# Patient Record
Sex: MALE | Race: BLACK OR AFRICAN AMERICAN | Employment: UNEMPLOYED | URBAN - NONMETROPOLITAN AREA
[De-identification: names, ages, dates, MRNs, and addresses within clinical notes are randomized per-mention and may not be internally consistent; named-entity substitution may affect disease eponyms.]

---

## 2017-08-10 ENCOUNTER — HISTORY (OUTPATIENT)
Dept: EMERGENCY MEDICINE | Facility: OTHER | Age: 32
End: 2017-08-10

## 2017-09-11 ENCOUNTER — HISTORY (OUTPATIENT)
Dept: FAMILY MEDICINE | Facility: OTHER | Age: 32
End: 2017-09-11

## 2017-09-11 ENCOUNTER — OFFICE VISIT - GICH (OUTPATIENT)
Dept: FAMILY MEDICINE | Facility: OTHER | Age: 32
End: 2017-09-11

## 2017-09-11 DIAGNOSIS — Z00.00 ENCOUNTER FOR GENERAL ADULT MEDICAL EXAMINATION WITHOUT ABNORMAL FINDINGS: ICD-10-CM

## 2017-09-11 DIAGNOSIS — F51.01 PRIMARY INSOMNIA: ICD-10-CM

## 2017-10-08 ENCOUNTER — HISTORY (OUTPATIENT)
Dept: EMERGENCY MEDICINE | Facility: OTHER | Age: 32
End: 2017-10-08

## 2017-10-10 ENCOUNTER — OFFICE VISIT - GICH (OUTPATIENT)
Dept: ORTHOPEDICS | Facility: OTHER | Age: 32
End: 2017-10-10

## 2017-10-10 ENCOUNTER — HISTORY (OUTPATIENT)
Dept: ORTHOPEDICS | Facility: OTHER | Age: 32
End: 2017-10-10

## 2017-10-10 DIAGNOSIS — S92.344A CLOSED NONDISPLACED FRACTURE OF FOURTH METATARSAL BONE OF RIGHT FOOT: ICD-10-CM

## 2017-10-10 DIAGNOSIS — S92.334A CLOSED NONDISPLACED FRACTURE OF THIRD METATARSAL BONE OF RIGHT FOOT: ICD-10-CM

## 2017-10-10 DIAGNOSIS — S92.321A CLOSED DISPLACED FRACTURE OF SECOND METATARSAL BONE OF RIGHT FOOT: ICD-10-CM

## 2017-10-19 ENCOUNTER — AMBULATORY - GICH (OUTPATIENT)
Dept: ORTHOPEDICS | Facility: OTHER | Age: 32
End: 2017-10-19

## 2017-10-19 DIAGNOSIS — M79.671 PAIN OF RIGHT FOOT: ICD-10-CM

## 2017-10-24 ENCOUNTER — HISTORY (OUTPATIENT)
Dept: ORTHOPEDICS | Facility: OTHER | Age: 32
End: 2017-10-24

## 2017-10-24 ENCOUNTER — OFFICE VISIT - GICH (OUTPATIENT)
Dept: ORTHOPEDICS | Facility: OTHER | Age: 32
End: 2017-10-24

## 2017-10-24 ENCOUNTER — HOSPITAL ENCOUNTER (OUTPATIENT)
Dept: RADIOLOGY | Facility: OTHER | Age: 32
End: 2017-10-24
Attending: ORTHOPAEDIC SURGERY

## 2017-10-24 DIAGNOSIS — M79.671 PAIN OF RIGHT FOOT: ICD-10-CM

## 2017-10-24 DIAGNOSIS — S92.321D: ICD-10-CM

## 2017-10-24 DIAGNOSIS — S92.334D: ICD-10-CM

## 2017-10-24 DIAGNOSIS — S92.344D: ICD-10-CM

## 2017-11-08 ENCOUNTER — COMMUNICATION - GICH (OUTPATIENT)
Dept: ORTHOPEDICS | Facility: OTHER | Age: 32
End: 2017-11-08

## 2017-11-22 ENCOUNTER — AMBULATORY - GICH (OUTPATIENT)
Dept: ORTHOPEDICS | Facility: OTHER | Age: 32
End: 2017-11-22

## 2017-11-22 DIAGNOSIS — M79.671 PAIN OF RIGHT FOOT: ICD-10-CM

## 2017-11-28 ENCOUNTER — AMBULATORY - GICH (OUTPATIENT)
Dept: ORTHOPEDICS | Facility: OTHER | Age: 32
End: 2017-11-28

## 2017-11-28 DIAGNOSIS — M79.671 PAIN OF RIGHT FOOT: ICD-10-CM

## 2017-12-27 NOTE — PROGRESS NOTES
Patient Information     Patient Name MRN Sex Rox Dominguez [id number] Male 1985      Progress Notes by Arden Perdomo DO at 10/10/2017  2:15 PM     Author:  Arden Perdomo DO Service:  (none) Author Type:  PHYS- Osteopathic     Filed:  10/10/2017  3:24 PM Encounter Date:  10/10/2017 Status:  Signed     :  Arden Perdomo DO (PHYS- Osteopathic)            Rox Butler was seen in consultation for Dr. Castillo for a chief complaint of right foot injury.    CHIEF COMPLAINT: Rox Butler is a 32 y.o.  male  Chief Complaint     Patient presents with       Consult      Right foot doi-10/7/17       HISTORY OF PRESENTING INJURY:  32-year-old male presents for orthopedic evaluation of a right foot injury that occurred 3 days ago. The patient lives in [place], [place]. He was lifting and moving and old television when it fell onto his right foot. He was not wearing shoes. Only wearing socks. Injury occurred 3 days ago . Developed increased pain and swelling of the foot and presented for evaluation in the emergency room the next day . He developed bruising and swelling. X-rays demonstrated metatarsal fractures.  Patient presents with crutches and wearing a Cam Walker.  Treatment includes elevating and keeping weight off the foot.  Complains primarily of pain and swelling of the right foot.  The patient relates he received 15 pain pills from the emergency room.   No other injuries.  Currently not working.    REVIEW OF SYSTEMS:  Constitutional:  Denies constitutional problems  Cardiovascular: normal  Respiratory: normal    The review of systems as documented in the HPI and on the intake questionnaire, completed by the patient 10/10/2017, have been reviewed by myself and the pertinent positives and negatives addressed.  The remainder of the complete review of systems was non-contributory.    (PFSH) PAST, FAMILY, and/or SOCIAL HISTORY:    PAST MEDICAL HISTORY:  Past  "Medical History:     Diagnosis  Date     No Significant Past Medical History        PAST SURGICAL HISTORY:  Past Surgical History:      Procedure  Laterality Date     WRIST SURGERY Left        ALLERGIES:  No Known Allergies    CURRENT MEDICATIONS:  Current Outpatient Prescriptions       Medication  Sig Dispense Refill     Crutch For home use. 1 Device 0     doxylamine (UNISOM) 25 mg tablet Take 1 tablet by mouth at bedtime if needed for Sleep. 30 tablet 2     ibuprofen (ADVIL; MOTRIN) 200 mg cap Take 600 mg by mouth 4 times daily if needed.       oxyCODONE-acetaminophen, 5-325 mg, (PERCOCET) 5-325 mg per tablet Take 1 tablet by mouth every 4 hours if needed  for Pain Max acetaminophen dose: 4000mg in 24 hrs. 15 tablet 0     No current facility-administered medications for this visit.      Medications have been reviewed by me and are current to the best of my knowledge and ability.      FAMILY HISTORY:  No family history on file.    Additional [facility] information documented on the intake form completed by the patient 10/10/2017 was reviewed by myself.    PHYSICAL EXAM:   /80  Pulse 88  Ht 1.803 m (5' 11\")  Wt 83.9 kg (185 lb)  BMI 25.8 kg/m2 Body mass index is 25.8 kg/(m^2).    General Appearance: Pleasant male in good appearance, mood and affect.  Alert and orientated times three ( time, date and location).    Right  Foot/ Ankle examination  patients right calf is supple.  Moderate to large amount of swelling of the right foot.  No skin lacerations.  Palpable dorsalis pedis pulse.  Bruising to the foot and toes.  No pain of the right ankle with examination.    Xray/MRI/MRA:  Radiographic images where independently reviewed and discussed with the patient.    x-rays of the right foot demonstrates a comminuted second metatarsal base fracture with mild displacement.  Mostly transverse fractures, extra-articular of the base of the third and fourth metatarsals with good alignment.    Attending Doctor: COLE, " PRABHU ([id number])  :       CALVIN GILLETTE ([id number])  Report Date:       10/08/2017 11:52:12  Report Status:       Final  ======================= Begin of Report Content ======================    Exam:XR FOOT 3 VIEWS RIGHT  History: Foot injury on the right  Comparisons:None.  Technique: 3 views  Findings: There are fractures through the proximal thirds of the shafts of the second third and fourth metatarsals. The first and fifth metatarsals are intact the metatarsophalangeal and interphalangeal joints appear normal. The tarsal metatarsal joints are normally aligned    Impression: Proximal second third and fourth metatarsal fractures  Electronically Signed By: Calvin Gillette M.D. on 10/8/2017 11:48 AM    IMPRESSION:  Right second metatarsal base fracture, comminuted, with mild displacement  right third metatarsal base fracture, transverse in good alignment  right fourth metatarsal base fracture, transverse In the alignment  history of crush injury right foot 3 days ago October 7   Moderate to large amount of swelling of the right foot     PLAN:  Discussion included review of x-rays. Discussed the fractured metatarsal bases. Overall good alignment. Mild displacement of the second.  Recommendation to keep weight off the foot.  Continue with crutches.  Ice and elevate the foot above the heart.  Cam Walker seems to be working for him so continue with the Cam Walker. Discussed elevating the foot above the heart.   The patient requested some additional medication at this time.  Prescription switched to Vicodin 5/325 mg. Dispense 40. Caution with use.  Anticipate nonsurgical management for the fractures.  Discussed with the patient plan to have x-rays sent to foot and ankle specialist in [place] for evaluation.  We will plan to contact patient with any additional recommendations.  Otherwise, recheck and x-ray of the foot in 2 weeks.  Anticipate limited weightbearing for at least 6 weeks.  Questions  answered.    Arden Perdomo D.O., ALCONAKeithO.  Orthopedic Surgeon    [facility] [place]  [address] [place] [address]  Phone [phone]  Fax [phone]    2:43 PM 10/10/2017

## 2017-12-27 NOTE — PROGRESS NOTES
"Patient Information     Patient Name MRN Sex Rox Dominguez [id number] Male 1985      Progress Notes by Arden Perdomo DO at 10/24/2017  3:15 PM     Author:  Arden Perdomo DO Service:  (none) Author Type:  PHYS- Osteopathic     Filed:  10/24/2017  4:06 PM Encounter Date:  10/24/2017 Status:  Signed     :  Arden Perdomo DO (PHYS- Osteopathic)            PROGRESS NOTE    SUBJECTIVE:  Rox Butler is here for recheck of a right foot.     HPI: 17 days after right foot injury and fracture of the second, third and fourth metatarsal bases. The patient presents with a Cam Walker and crutches. Keeping weight off the foot. Elevating as needed and taking occasional pain medication. Continued pain to the foot area with associated swelling. Requesting a refill on pain medication. .    Review of Systems:  Constitutional: Denies constitutional problems    PFSH:  No change in information. See earlier PFSH questionnaire completed by the patient on initial visit.    OBJECTIVE:  /76  Pulse 96  Ht 1.803 m (5' 11\")  Wt 83.9 kg (185 lb)  BMI 25.8 kg/m2 Body mass index is 25.8 kg/(m^2).  Patient is alert and orientated x3 and answers questions appropriately.    Right Foot/ankle exam:   Patient continues to have at least moderate swelling of the right foot. Pain along the metatarsal base with palpation. Palpable pulse. Capillary refill less than 2 seconds. Nontender right ankle with palpation.    XRAY:   Radiographic images where independently reviewed and discussed with the patient.    X-rays today demonstrates the fractured second, third and fourth metatarsal bases and very similar alignment and position.    IMPRESSION:  Right foot second, third and fourth metatarsal base fractures secondary to a crush injury on 10/7/17.  Moderate swelling of the foot.    PLAN:  Discussion included review of x-rays.  Recommend continuing with the Cam Walker and crutches keeping most of the weight off the " foot. Okay for balance weight.  Recommend ice and elevation to the foot.  I refilled a prescription for Vicodin. Recommend using it mostly at nighttime as needed. Discussed over-the-counter medication use.  Plan to recheck and x-ray the foot in 5 weeks.  Questions answered.    Arden Perdomo D.O.  Orthopedic Surgeon    [address] [address]  Phone [phone]  Fax [phone]    4:02 PM 10/24/2017

## 2017-12-28 NOTE — PROGRESS NOTES
Patient Information     Patient Name MRN Sex Rox Dominguez [id number] Male 1985      Progress Notes by Eli Gomez PA-C at 2017  9:15 AM     Author:  Eli Gomez PA-C Service:  (none) Author Type:  PHYS- Physician Assistant     Filed:  2017 10:00 AM Encounter Date:  2017 Status:  Signed     :  Eli Gomez PA-C (PHYS- Physician Assistant)            Nursing Notes:   Shira Alaniz  2017  9:26 AM  Signed  Patient is here for physical, is currently at [facility].   Shira Alaniz LPN .............2017  9:12 AM      HPI: Rox Butler is a 32 y.o. male who presents for a yearly exam.  Concerns include: Currently at [facility] for alcohol abuse.     Trouble falling and staying asleep. Melatonin has not worked well in the past. Interested in trying something different. Has not tried benadryl or Unisom in the past. No caffeine in the evening.     STD concerns: no, declines screening.   Cholesterol/DM concerns/screening: no  Prostate cancer screening discussed:  Not indicated, patient is average risk and younger than 50.  Family history of colon or prostate CA?: no  Colonoscopy: na  Immunizations: declines    There are no active problems to display for this patient.      Past Medical History:     Diagnosis  Date     No Significant Past Medical History        Past Surgical History:      Procedure  Laterality Date     WRIST SURGERY Left        Social History     Social History        Marital status:  Single     Spouse name: N/A     Number of children:  N/A     Years of education:  N/A     Occupational History      Not on file.     Social History Main Topics         Smoking status:   Current Every Day Smoker     Packs/day:  0.50     Types:  Cigarettes     Smokeless tobacco:   Never Used     Alcohol use   No      Comment: Currently at [facility]      Drug use:   No     Sexual activity:   Not on file     Other Topics  Concern      "Not on file      Social History Narrative       No family history on file.    No current outpatient prescriptions on file.     No current facility-administered medications for this visit.      Medications have been reviewed by me and are current to the best of my knowledge and ability.       REVIEW OF SYSTEMS:  Refer to HPI.    Physical Exam:  /80  Pulse 84  Ht 1.797 m (5' 10.75\")  Wt 82.1 kg (181 lb)  BMI 25.42 kg/m2   CONSTITUTIONAL:  Alert, cooperative, NAD.  HEAD:  Normal. Normocephalic, atraumatic.  EYES:  Normal external eye, conjunctiva, lids.  No scleral icterus.  ENT/MOUTH:  External ears and nose normal.  Moist mucous membranes.    ENDO: No thyromegaly or thyroid nodules.  LYMPH:  No cervical or supraclavicular LA.    CARDIOVASCULAR: Regular, S1, S2.  No S3 or S4.  No murmur/gallop/rub.  No peripheral edema.  RESPIRATORY: CTA bilaterally, no wheezes, rhonchi or rales.  GI: Bowel sounds wnl.  Soft, nontender, nondistended.  No masses or HSM.  No rebound or guarding.  : declines exam  MSKEL: Grossly normal ROM.  No clubbing.  INTEGUMENTARY:  Warm, dry.  No rash noted on exposed skin.  NEUROLOGIC:  Facies symmetric.  Grossly normal movement and tone.  No tremor.  PSYCHIATRIC:  Affect normal.  Speech fluent.       PHQ Depression Screen  Date of PHQ exam: 09/11/17  Over the last 2 weeks, how often have you been bothered by any of the following problems?  1. Little interest or pleasure in doing things: 0 - Not at all  2. Feeling down, depressed, or hopeless: 0 - Not at all       No results found for this or any previous visit.    ASSESSMENT/PLAN:    ICD-10-CM    1. Physical exam, annual Z00.00    2. Primary insomnia F51.01 doxylamine (UNISOM) 25 mg tablet       Insomnia - Encouraged to take benadryl 25-50mg, melatonin, or unisom to treat insomnia.  Encouraged routine bedtime, getting up in the morning at the same time, minimal naps during the day, and no caffeine after 3pm. Return in 1-2 months if " "having persistent or worsening symptoms.   Gave Rx for unisom.       ?Sleep as long as necessary to feel rested (usually seven to eight hours for adults) and then get out of bed  ?Maintain a regular sleep schedule, particularly a regular wake-up time in the morning  ?Try not to force sleep  ?Avoid caffeinated beverages after lunch  ?Avoid alcohol near bedtime (eg, late afternoon and evening)  ?Avoid smoking or other nicotine intake, particularly during the evening  ?Adjust the bedroom environment as needed to decrease stimuli (eg, reduce ambient light, turn off the television or radio)  ?Avoid prolonged use of light-emitting screens (laptops, tablets, smartphones, [employer]) before bedtime  ?Resolve concerns or worries before bedtime  ?Exercise regularly for at least 20 minutes, preferably more than four to five hours prior to bedtime  ?Avoid daytime naps, especially if they are longer than 20 to 30 minutes or occur late in the day      Declines screening or vaccines.   No concerns at this time.   Return in 1 year for repeat physical.     Patient Instructions   Healthy Strategies  1. Eat at least 3 meals a day and never skip breakfast.  2. Eat more slowly.  3. Decrease portion size.  4. Provide structure by using meal replacement bars or shakes, and/or low calorie frozen meals.  5. For good nutrition incorporate fruit, vegetables, whole grains, lean protein, and low-fat dairy.  6. Remove trigger foods from your environment to avoid impulse eating.  7. Increase physical activity: get a pedometer and aim for 10,000 steps a day or 30-35 minutes of activity 5 days per week.  8. Weigh yourself daily or at least weekly.  9. Keep a record of what you eat and your activity.  10. Establish a support system such as a friend, group or program.  11. Read Lane Garibay's \"Eat to Live\". Remember it is important to have a minimum of 1200 calories a day, okay to use olive oil, 40 grams of fiber daily. No more than two servings ( the " size of your palm) of red meat a week.     Please consider the following general health recommendations:    Eat a quality diet (generally, low in simple sugars, starches, cholesterol and saturated fat.)    Please get 1500 mg of calcium in divided doses with 1500 units vitamin D in your diet daily.     Stay physically active. Regular walking or other exercise is one of the best ways to minimize pain of arthritis; maintain independence and mobility; maintain bone strength; maintain conditioning of your heart. Find something you enjoy and a friend to do it with you.    Maintain ideal weight. Your Body mass index is Body mass index is 25.42 kg/(m^2).. Generally a BMI of 20-25 is considered ideal. Overweight is defined as 25-30, Obese is 30-35 and markedly obese is greater than 35.    Apply sun block (SPF 25 or greater) on exposed skin anytime you are out in the sun to prevent skin cancer.     Wear a seatbelt whenever you are in a car.    Obtain a flu shot every fall.    You should have a tetanus booster at least once every 10 years.    Check blood sugar annually. Cholesterol annually unless you have had a normal level when last checked within 5 years.     I recommend that you have a general physical exam every year.           Colon and prostate cancer screening discussed.      Counseled on healthy diet and exercise.    Eli Gomez PA-C ....................  9/11/2017   9:27 AM

## 2017-12-28 NOTE — PATIENT INSTRUCTIONS
"Patient Information     Patient Name MRN Rox Camp [id number] Male 1985      Patient Instructions by Eli Gomez PA-C at 2017  9:15 AM     Author:  Eli Gomez PA-C  Service:  (none) Author Type:  PHYS- Physician Assistant     Filed:  2017  9:59 AM  Encounter Date:  2017 Status:  Addendum     :  Eli Gomez PA-C (PHYS- Physician Assistant)        Related Notes: Original Note by Eli Gomez PA-C (PHYS- Physician Assistant) filed at 2017  9:36 AM            Healthy Strategies  1. Eat at least 3 meals a day and never skip breakfast.  2. Eat more slowly.  3. Decrease portion size.  4. Provide structure by using meal replacement bars or shakes, and/or low calorie frozen meals.  5. For good nutrition incorporate fruit, vegetables, whole grains, lean protein, and low-fat dairy.  6. Remove trigger foods from your environment to avoid impulse eating.  7. Increase physical activity: get a pedometer and aim for 10,000 steps a day or 30-35 minutes of activity 5 days per week.  8. Weigh yourself daily or at least weekly.  9. Keep a record of what you eat and your activity.  10. Establish a support system such as a friend, group or program.  11. Read Lane Garibay's \"Eat to Live\". Remember it is important to have a minimum of 1200 calories a day, okay to use olive oil, 40 grams of fiber daily. No more than two servings ( the size of your palm) of red meat a week.     Please consider the following general health recommendations:    Eat a quality diet (generally, low in simple sugars, starches, cholesterol and saturated fat.)    Please get 1500 mg of calcium in divided doses with 1500 units vitamin D in your diet daily.     Stay physically active. Regular walking or other exercise is one of the best ways to minimize pain of arthritis; maintain independence and mobility; maintain bone strength; maintain conditioning of your heart. Find something you enjoy and a " friend to do it with you.    Maintain ideal weight. Your Body mass index is Body mass index is 25.42 kg/(m^2).. Generally a BMI of 20-25 is considered ideal. Overweight is defined as 25-30, Obese is 30-35 and markedly obese is greater than 35.    Apply sun block (SPF 25 or greater) on exposed skin anytime you are out in the sun to prevent skin cancer.     Wear a seatbelt whenever you are in a car.    Obtain a flu shot every fall.    You should have a tetanus booster at least once every 10 years.    Check blood sugar annually. Cholesterol annually unless you have had a normal level when last checked within 5 years.     I recommend that you have a general physical exam every year.         Insomnia - Encouraged to take benadryl 25-50mg, melatonin, or unisom to treat insomnia.  Encouraged routine bedtime, getting up in the morning at the same time, minimal naps during the day, and no caffeine after 3pm. Return in 1-2 months if having persistent or worsening symptoms.       ?Sleep as long as necessary to feel rested (usually seven to eight hours for adults) and then get out of bed  ?Maintain a regular sleep schedule, particularly a regular wake-up time in the morning  ?Try not to force sleep  ?Avoid caffeinated beverages after lunch  ?Avoid alcohol near bedtime (eg, late afternoon and evening)  ?Avoid smoking or other nicotine intake, particularly during the evening  ?Adjust the bedroom environment as needed to decrease stimuli (eg, reduce ambient light, turn off the television or radio)  ?Avoid prolonged use of light-emitting screens (laptops, tablets, smartphones, [employer]ooks) before bedtime  ?Resolve concerns or worries before bedtime  ?Exercise regularly for at least 20 minutes, preferably more than four to five hours prior to bedtime  ?Avoid daytime naps, especially if they are longer than 20 to 30 minutes or occur late in the day

## 2017-12-28 NOTE — TELEPHONE ENCOUNTER
Patient Information     Patient Name MRN Rox Camp [id number] Male 1985      Telephone Encounter by Viki Ugarte at 2017  8:44 AM     Author:  Viki Ugarte Service:  (none) Author Type:  (none)     Filed:  2017  8:58 AM Encounter Date:  2017 Status:  Signed     :  Viki Ugarte            Called patient and let him know that he is one month out from his injury already and that we usually do not refill pain medication this long.  Dr. Perdomo had talked to him about using the over the counter medications at his last visit a couple weeks ago.  Patient stated that he still has a little bit of pain.  He did say he would try the over the counter pain medications.  Dr. Perdomo agreed with this that patient should try OTC medication for his pain.    Viki Ugarte LPN .......2017 8:57 AM

## 2017-12-30 NOTE — NURSING NOTE
Patient Information     Patient Name MRN Rox Camp [id number] Male 1985      Nursing Note by Shira Alaniz at 2017  9:15 AM     Author:  Shira Alaniz Service:  (none) Author Type:  (none)     Filed:  2017  9:26 AM Encounter Date:  2017 Status:  Signed     :  Shira Alaniz            Patient is here for physical, is currently at [facility].   Shira Alaniz LPN .............2017  9:12 AM

## 2017-12-30 NOTE — NURSING NOTE
Patient Information     Patient Name MRN Sex Rox Dominguez [id number] Male 1985      Nursing Note by Gosselin, Norma J at 10/10/2017  2:15 PM     Author:  Gosselin, Norma J Service:  (none) Author Type:  (none)     Filed:  10/10/2017  2:25 PM Encounter Date:  10/10/2017 Status:  Signed     :  Gosselin, Norma J            Consult right foot injury 10/7/17.  Referred by Dr Castillo.  Norma J Gosselin LPN....................  10/10/2017   2:21 PM

## 2017-12-30 NOTE — NURSING NOTE
Patient Information     Patient Name MRN Rox Camp [id number] Male 1985      Nursing Note by Viki Ugarte at 10/24/2017  3:15 PM     Author:  Viki Ugarte Service:  (none) Author Type:  (none)     Filed:  10/24/2017  3:08 PM Encounter Date:  10/24/2017 Status:  Signed     :  Viki Ugarte            Patient is here for a follow up on his right foot.  DOI: 10/7/17  Viki Ugarte LPN .......10/24/2017 3:08 PM

## 2018-01-27 VITALS
HEIGHT: 71 IN | BODY MASS INDEX: 25.34 KG/M2 | BODY MASS INDEX: 25.9 KG/M2 | HEIGHT: 71 IN | WEIGHT: 185 LBS | DIASTOLIC BLOOD PRESSURE: 80 MMHG | WEIGHT: 181 LBS | DIASTOLIC BLOOD PRESSURE: 80 MMHG | HEART RATE: 96 BPM | DIASTOLIC BLOOD PRESSURE: 76 MMHG | SYSTOLIC BLOOD PRESSURE: 130 MMHG | SYSTOLIC BLOOD PRESSURE: 138 MMHG | WEIGHT: 185 LBS | SYSTOLIC BLOOD PRESSURE: 122 MMHG | HEART RATE: 84 BPM | BODY MASS INDEX: 25.9 KG/M2 | HEART RATE: 88 BPM | HEIGHT: 71 IN

## 2018-02-11 ENCOUNTER — DOCUMENTATION ONLY (OUTPATIENT)
Dept: FAMILY MEDICINE | Facility: OTHER | Age: 33
End: 2018-02-11

## 2018-02-11 RX ORDER — OXYCODONE AND ACETAMINOPHEN 5; 325 MG/1; MG/1
1 TABLET ORAL EVERY 4 HOURS PRN
COMMUNITY
Start: 2017-10-08 | End: 2018-06-04

## 2018-02-11 RX ORDER — OMEGA-3 FATTY ACIDS/FISH OIL 300-1000MG
600 CAPSULE ORAL 4 TIMES DAILY PRN
COMMUNITY
End: 2018-05-30

## 2018-02-11 RX ORDER — CRUTCH
EACH MISCELLANEOUS
COMMUNITY
Start: 2017-10-08 | End: 2018-06-04

## 2018-02-11 RX ORDER — HYDROCODONE BITARTRATE AND ACETAMINOPHEN 5; 325 MG/1; MG/1
1 TABLET ORAL EVERY 6 HOURS PRN
COMMUNITY
Start: 2017-10-24 | End: 2018-05-30

## 2018-05-30 ENCOUNTER — HOSPITAL ENCOUNTER (EMERGENCY)
Facility: OTHER | Age: 33
Discharge: HOME | End: 2018-05-30
Attending: PHYSICIAN ASSISTANT | Admitting: PHYSICIAN ASSISTANT
Payer: COMMERCIAL

## 2018-05-30 VITALS
RESPIRATION RATE: 16 BRPM | TEMPERATURE: 97.9 F | HEIGHT: 71 IN | SYSTOLIC BLOOD PRESSURE: 121 MMHG | BODY MASS INDEX: 25.2 KG/M2 | OXYGEN SATURATION: 99 % | WEIGHT: 180 LBS | DIASTOLIC BLOOD PRESSURE: 78 MMHG | HEART RATE: 78 BPM

## 2018-05-30 DIAGNOSIS — K08.89 PAIN, DENTAL: ICD-10-CM

## 2018-05-30 DIAGNOSIS — K04.7 DENTAL ABSCESS: ICD-10-CM

## 2018-05-30 PROCEDURE — 99283 EMERGENCY DEPT VISIT LOW MDM: CPT | Mod: Z6 | Performed by: PHYSICIAN ASSISTANT

## 2018-05-30 PROCEDURE — 99282 EMERGENCY DEPT VISIT SF MDM: CPT | Performed by: PHYSICIAN ASSISTANT

## 2018-05-30 RX ORDER — HYDROCODONE BITARTRATE AND ACETAMINOPHEN 5; 325 MG/1; MG/1
1 TABLET ORAL EVERY 6 HOURS PRN
Qty: 15 TABLET | Refills: 0 | Status: SHIPPED | OUTPATIENT
Start: 2018-05-30 | End: 2018-06-04

## 2018-05-30 RX ORDER — IBUPROFEN 800 MG/1
800 TABLET, FILM COATED ORAL EVERY 8 HOURS PRN
Qty: 60 TABLET | Refills: 0 | Status: SHIPPED | OUTPATIENT
Start: 2018-05-30 | End: 2018-06-04

## 2018-05-30 ASSESSMENT — ENCOUNTER SYMPTOMS
EYE REDNESS: 0
CONFUSION: 0
SHORTNESS OF BREATH: 0
NECK STIFFNESS: 0
COLOR CHANGE: 0
HEADACHES: 0
DIFFICULTY URINATING: 0
ARTHRALGIAS: 0
FACIAL SWELLING: 0
FEVER: 0
ABDOMINAL PAIN: 0
CHILLS: 0

## 2018-05-30 NOTE — DISCHARGE INSTRUCTIONS
"  Dental Abscess    An abscess is a pocket of pus at the tip of a tooth root in your jaw bone. It is caused by an infection at the root of the tooth. It can cause pain and swelling of the gum, cheek, or jaw. Pain may spread from the tooth to your ear or the area of your jaw on the same side. If the abscess isn t treated, it appears as a bubble or swelling on the gum near the tooth. The pressure that builds in this swelling is the source of the pain. More serious infections cause your face to swell.  An abscess can be caused by a crack in the tooth, a cavity, a gum infection, or a combination of these. Once the pulp of the tooth is exposed, bacteria can spread down the roots to the tip. If the bacteria are not stopped, they can damage the bone and soft tissue, and an abscess can form.  Home care  Follow these guidelines when caring for yourself at home:    Don't have hot and cold foods and drinks. Your tooth may be sensitive to changes in temperature. Don t chew on the side of the infected tooth.    If your tooth is chipped or cracked, or if there is a large open cavity, put oil of cloves directly on the tooth to relieve pain. You can buy oil of cloves at drugstores. Some pharmacies carry an over-the-counter \"toothache kit.\" This contains a paste that you can put on the exposed tooth to make it less sensitive.    Put a cold pack on your jaw over the sore area to help reduce pain.    You may use over-the-counter medicine to ease pain, unless another medicine was prescribed. If you have chronic liver or kidney disease, talk with your healthcare provider before using acetaminophen or ibuprofen. Also talk with your provider if you ve had a stomach ulcer or GI bleeding.    An antibiotic will be prescribed. Take it until finished, even if you are feeling better after a few days.  Follow-up care  Follow up with your dentist or an oral surgeon, or as advised. Once an infection occurs in a tooth, it will continue to be a " problem until the infection is drained. This is done through surgery or a root canal. Or you may need to have your tooth pulled.  Call 911  Call 911 if any of these occur:    Unusual drowsiness    Headache or stiff neck    Weakness or fainting    Difficulty swallowing, breathing, or opening your mouth    Swollen eyelids  When to seek medical advice  Call your healthcare provider right away if any of these occur:    Your face becomes more swollen or red    Pain gets worse or spreads to your neck    Fever of 100.4  F (38.0  C) or higher, or as directed by your healthcare provider    Pus drains from the tooth  Date Last Reviewed: 10/1/2016    7705-3533 [employer]. [address] [place] [address], [place] [address]. All rights reserved. This information is not intended as a substitute for professional medical care. Always follow your healthcare professional's instructions.

## 2018-05-30 NOTE — ED TRIAGE NOTES
Patient is having increased pain in lower L back madi that broke recently. Pain started yesterday. Patient also has a broken tooth on the R top back madi. Patient has an appt with Dentist in [place] on Tuesday. Patient has been taking ibuprofen and oragel for relief. Denies any fevers. Patient having a hard time chewing food. Liana Mace RN on 5/30/2018 at 3:54 PM

## 2018-05-30 NOTE — ED AVS SNAPSHOT
[facility]    [address]    [place] [address]    Phone:  [phone]    Fax:  [phone]                                       Rox Butler   MRN: [id number]    Department:  [facility]   Date of Visit:  5/30/2018           After Visit Summary Signature Page     I have received my discharge instructions, and my questions have been answered. I have discussed any challenges I see with this plan with the nurse or doctor.    ..........................................................................................................................................  Patient/Patient Representative Signature      ..........................................................................................................................................  Patient Representative Print Name and Relationship to Patient    ..................................................               ................................................  Date                                            Time    ..........................................................................................................................................  Reviewed by Signature/Title    ...................................................              ..............................................  Date                                                            Time

## 2018-05-30 NOTE — ED PROVIDER NOTES
History     Chief Complaint   Patient presents with     Dental Pain     HPI Comments: This is a 31 yo male who noticed left lower dental pan.  He does have an appointment with a Dentist next week for definitive care.  Denies any fever or chills.  No facial swelling but swelling and redness to the left lower posterior molar.  He is here for further evaluation at this time./    The history is provided by the patient.         Problem List:    There are no active problems to display for this patient.       Past Medical History:    Past Medical History:   Diagnosis Date     Personal history of other medical treatment (CODE)        Past Surgical History:    Past Surgical History:   Procedure Laterality Date     OTHER SURGICAL HISTORY      [id number],WRIST SURGERY,Left       Family History:    No family history on file.    Social History:  Marital Status:  Single [1]  Social History   Substance Use Topics     Smoking status: Current Every Day Smoker     Packs/day: 0.50     Types: Cigarettes     Smokeless tobacco: Never Used     Alcohol use No      Comment: Alcoholic Drinks/day: Currently at [facility]        Medications:      amoxicillin-clavulanate (AUGMENTIN) 875-125 MG per tablet   HYDROcodone-acetaminophen (NORCO) 5-325 MG per tablet   ibuprofen (ADVIL/MOTRIN) 800 MG tablet   doxylamine (UNISOM) 25 MG TABS tablet   Misc. Devices (CRUTCH) MISC   oxyCODONE-acetaminophen (PERCOCET) 5-325 MG per tablet         Review of Systems   Constitutional: Negative for chills and fever.   HENT: Positive for dental problem. Negative for congestion and facial swelling.    Eyes: Negative for redness.   Respiratory: Negative for shortness of breath.    Cardiovascular: Negative for chest pain.   Gastrointestinal: Negative for abdominal pain.   Genitourinary: Negative for difficulty urinating.   Musculoskeletal: Negative for arthralgias and neck stiffness.   Skin: Negative for color change.   Neurological: Negative for headaches.  "  Psychiatric/Behavioral: Negative for confusion.       Physical Exam   BP: 128/78  Pulse: 78  Temp: 97.9  F (36.6  C)  Resp: 16  Height: 180.3 cm (5' 11\")  Weight: 81.6 kg (180 lb)  SpO2: 99 %      Physical Exam   Constitutional: He is oriented to person, place, and time. No distress.   HENT:   Head: Atraumatic.   Mouth/Throat: Oropharynx is clear and moist. No oropharyngeal exudate.       Tooth #17 with noted gingival swelling and tenderness.  No drainage. No facial swelling   Eyes: Pupils are equal, round, and reactive to light. No scleral icterus.   Cardiovascular: Normal heart sounds and intact distal pulses.    Pulmonary/Chest: Breath sounds normal. No respiratory distress.   Abdominal: Soft. Bowel sounds are normal. There is no tenderness.   Musculoskeletal: He exhibits no edema or tenderness.   Neurological: He is alert and oriented to person, place, and time.   Skin: Skin is warm. No rash noted. He is not diaphoretic.       ED Course     ED Course     Procedures             No results found for this or any previous visit (from the past 24 hour(s)).    Medications - No data to display    Assessments & Plan (with Medical Decision Making)     I have reviewed the nursing notes.    I have reviewed the findings, diagnosis, plan and need for follow up with the patient.      New Prescriptions    AMOXICILLIN-CLAVULANATE (AUGMENTIN) 875-125 MG PER TABLET    Take 1 tablet by mouth 2 times daily    HYDROCODONE-ACETAMINOPHEN (NORCO) 5-325 MG PER TABLET    Take 1 tablet by mouth every 6 hours as needed for severe pain    IBUPROFEN (ADVIL/MOTRIN) 800 MG TABLET    Take 1 tablet (800 mg) by mouth every 8 hours as needed for moderate pain       Final diagnoses:   Dental abscess   Pain, dental     Afebrile.  VSS.  Left lower #17 tooth pain with dental abscess.  Discussed close dental follow up and he has an appointment next week.  [place] and no signs of [place]otic abuse or over use.  Last Rx 10/2017.  Rx for augmentin, " motrin and norco#15.  Follow up sooner if there are any concerns.   5/30/2018   [facility] [place]     Christos Hillman PA-C  05/30/18 0590

## 2018-05-30 NOTE — ED AVS SNAPSHOT
" [facility]    [address]    [place] [address]    Phone:  [phone]    Fax:  [phone]                                       Rox Butler   MRN: [id number]    Department:  [facility]   Date of Visit:  5/30/2018           Patient Information     Date Of Birth          1985        Your diagnoses for this visit were:     Dental abscess     Pain, dental        You were seen by Christos Hillman PA-C.      Follow-up Information     Schedule an appointment as soon as possible for a visit with No Ref-Primary, Physician.    Why:  As needed, If symptoms worsen        Discharge Instructions         Dental Abscess    An abscess is a pocket of pus at the tip of a tooth root in your jaw bone. It is caused by an infection at the root of the tooth. It can cause pain and swelling of the gum, cheek, or jaw. Pain may spread from the tooth to your ear or the area of your jaw on the same side. If the abscess isn t treated, it appears as a bubble or swelling on the gum near the tooth. The pressure that builds in this swelling is the source of the pain. More serious infections cause your face to swell.  An abscess can be caused by a crack in the tooth, a cavity, a gum infection, or a combination of these. Once the pulp of the tooth is exposed, bacteria can spread down the roots to the tip. If the bacteria are not stopped, they can damage the bone and soft tissue, and an abscess can form.  Home care  Follow these guidelines when caring for yourself at home:    Don't have hot and cold foods and drinks. Your tooth may be sensitive to changes in temperature. Don t chew on the side of the infected tooth.    If your tooth is chipped or cracked, or if there is a large open cavity, put oil of cloves directly on the tooth to relieve pain. You can buy oil of cloves at drugstores. Some pharmacies carry an over-the-counter \"toothache kit.\" This contains a paste that " you can put on the exposed tooth to make it less sensitive.    Put a cold pack on your jaw over the sore area to help reduce pain.    You may use over-the-counter medicine to ease pain, unless another medicine was prescribed. If you have chronic liver or kidney disease, talk with your healthcare provider before using acetaminophen or ibuprofen. Also talk with your provider if you ve had a stomach ulcer or GI bleeding.    An antibiotic will be prescribed. Take it until finished, even if you are feeling better after a few days.  Follow-up care  Follow up with your dentist or an oral surgeon, or as advised. Once an infection occurs in a tooth, it will continue to be a problem until the infection is drained. This is done through surgery or a root canal. Or you may need to have your tooth pulled.  Call 911  Call 911 if any of these occur:    Unusual drowsiness    Headache or stiff neck    Weakness or fainting    Difficulty swallowing, breathing, or opening your mouth    Swollen eyelids  When to seek medical advice  Call your healthcare provider right away if any of these occur:    Your face becomes more swollen or red    Pain gets worse or spreads to your neck    Fever of 100.4  F (38.0  C) or higher, or as directed by your healthcare provider    Pus drains from the tooth  Date Last Reviewed: 10/1/2016    0479-5215 [employer]. [address] [place] [address], [place] [address]. All rights reserved. This information is not intended as a substitute for professional medical care. Always follow your healthcare professional's instructions.          24 Hour Appointment Hotline       To make an appointment at any [facility], call [phone] ([phone]). If you don't have a family doctor or clinic, we will help you find one. [facility] are conveniently located to serve the needs of you and your family.             Review of your medicines      START taking        Dose / Directions Last dose taken     amoxicillin-clavulanate 875-125 MG per tablet   Commonly known as:  AUGMENTIN   Dose:  1 tablet   Quantity:  20 tablet        Take 1 tablet by mouth 2 times daily   Refills:  0        ibuprofen 800 MG tablet   Commonly known as:  ADVIL/MOTRIN   Dose:  800 mg   Quantity:  60 tablet   Replaces:  ibuprofen 200 MG capsule        Take 1 tablet (800 mg) by mouth every 8 hours as needed for moderate pain   Refills:  0          CONTINUE these medicines which may have CHANGED, or have new prescriptions. If we are uncertain of the size of tablets/capsules you have at home, strength may be listed as something that might have changed.        Dose / Directions Last dose taken    HYDROcodone-acetaminophen 5-325 MG per tablet   Commonly known as:  NORCO   Dose:  1 tablet   What changed:    - reasons to take this  - additional instructions   Quantity:  15 tablet        Take 1 tablet by mouth every 6 hours as needed for severe pain   Refills:  0          Our records show that you are taking the medicines listed below. If these are incorrect, please call your family doctor or clinic.        Dose / Directions Last dose taken    [facility]        For home use.   Refills:  0        doxylamine 25 MG Tabs tablet   Commonly known as:  UNISOM   Dose:  25 mg        Take 25 mg by mouth nightly as needed for sleep   Refills:  0        oxyCODONE-acetaminophen 5-325 MG per tablet   Commonly known as:  PERCOCET   Dose:  1 tablet        Take 1 tablet by mouth every 4 hours as needed for pain Max acetaminophen dose 4000 mg in 24 hrs   Refills:  0          STOP taking        Dose Reason for stopping Comments    ibuprofen 200 MG capsule   Replaced by:  ibuprofen 800 MG tablet                      Information about OPIOIDS     PRESCRIPTION OPIOIDS: WHAT YOU NEED TO KNOW   You have a prescription for an opioid (narcotic) pain medicine. Opioids can cause addiction. If you have a history of chemical dependency of any type, you are at a higher risk of  becoming addicted to opioids. Only take this medicine after all other options have been tried. Take it for as short a time and as few doses as possible.     Do not:    Drive. If you drive while taking these medicines, you could be arrested for driving under the influence (DUI).    Operate heavy machinery    Do any other dangerous activities while taking these medicines.     Drink any alcohol while taking these medicines.      Take with any other medicines that contain acetaminophen. Read all labels carefully. Look for the word  acetaminophen  or  Tylenol.  Ask your pharmacist if you have questions or are unsure.    Store your pills in a secure place, locked if possible. We will not replace any lost or stolen medicine. If you don t finish your medicine, please throw away (dispose) as directed by your pharmacist. The [place] Pollution Control Agency has more information about safe disposal: https://www.pca.[facility].[place]/living-green/managing-unwanted-medications    All opioids tend to cause constipation. Drink plenty of water and eat foods that have a lot of fiber, such as fruits, vegetables, prune juice, apple juice and high-fiber cereal. Take a laxative (Miralax, milk of magnesia, Colace, Senna) if you don t move your bowels at least every other day.         Prescriptions were sent or printed at these locations (3 Prescriptions)                   [facility] - [place], [place] - [address]   [address], [facility] [address]    Telephone:  [phone]   Fax:  [phone]   Hours:                  Printed at Department/Unit printer (3 of 3)         amoxicillin-clavulanate (AUGMENTIN) 875-125 MG per tablet               HYDROcodone-acetaminophen (NORCO) 5-325 MG per tablet               ibuprofen (ADVIL/MOTRIN) 800 MG tablet                Orders Needing Specimen Collection     None      Pending Results     No orders found from 5/28/2018 to 5/31/2018.           "  Pending Culture Results     No orders found from 2018 to 2018.            Pending Results Instructions     If you had any lab results that were not finalized at the time of your Discharge, you can call the ED Lab Result RN at [phone]. You will be contacted by this team for any positive Lab results or changes in treatment. The nurses are available 7 days a week from 10A to 6:30P.  You can leave a message 24 hours per day and they will return your call.        Thank you for choosing [facility]       Thank you for choosing [facility] for your care. Our goal is always to provide you with excellent care. Hearing back from our patients is one way we can continue to improve our services. Please take a few minutes to complete the written survey that you may receive in the mail after you visit with us. Thank you!        [facility] Information     [employer] lets you send messages to your doctor, view your test results, renew your prescriptions, schedule appointments and more. To sign up, go to [email or web address] . Click on \"Log in\" on the left side of the screen, which will take you to the Welcome page. Then click on \"Sign up Now\" on the right side of the page.     You will be asked to enter the access code listed below, as well as some personal information. Please follow the directions to create your username and password.     Your access code is: [email or web address]  Expires: 2018  4:16 PM     Your access code will  in 90 days. If you need help or a new code, please call your [facility] clinic or [phone].        Care EveryWhere ID     This is your Care EveryWhere ID. This could be used by other organizations to access your [facility] medical records  [id number]        Equal Access to Services     SY LANGFORD : quentin Zuleta, martinez montgomery. So [facility] [phone].    ATENCIÓN: Si habla español, tiene a carranza disposición " servicios gratuitos de asistencia lingüística. Chloe neff [phone].    We comply with applicable federal civil rights laws and [place] laws. We do not discriminate on the basis of race, color, national origin, age, disability, sex, sexual orientation, or gender identity.            After Visit Summary       This is your record. Keep this with you and show to your community pharmacist(s) and doctor(s) at your next visit.

## 2018-06-04 ENCOUNTER — HOSPITAL ENCOUNTER (EMERGENCY)
Facility: OTHER | Age: 33
Discharge: HOME | End: 2018-06-05
Attending: FAMILY MEDICINE | Admitting: FAMILY MEDICINE
Payer: COMMERCIAL

## 2018-06-04 VITALS
HEIGHT: 71 IN | TEMPERATURE: 98.6 F | DIASTOLIC BLOOD PRESSURE: 71 MMHG | SYSTOLIC BLOOD PRESSURE: 122 MMHG | OXYGEN SATURATION: 98 % | RESPIRATION RATE: 16 BRPM | HEART RATE: 63 BPM

## 2018-06-04 DIAGNOSIS — K04.7 INFECTED DENTAL CARIES: ICD-10-CM

## 2018-06-04 DIAGNOSIS — K08.89 PAIN, DENTAL: ICD-10-CM

## 2018-06-04 DIAGNOSIS — K02.9 INFECTED DENTAL CARIES: ICD-10-CM

## 2018-06-04 PROCEDURE — 99282 EMERGENCY DEPT VISIT SF MDM: CPT | Performed by: FAMILY MEDICINE

## 2018-06-04 PROCEDURE — 99282 EMERGENCY DEPT VISIT SF MDM: CPT | Mod: Z6 | Performed by: FAMILY MEDICINE

## 2018-06-04 RX ORDER — OXYCODONE AND ACETAMINOPHEN 5; 325 MG/1; MG/1
1 TABLET ORAL EVERY 6 HOURS PRN
Qty: 6 TABLET | Refills: 0 | Status: SHIPPED | OUTPATIENT
Start: 2018-06-04 | End: 2018-08-07

## 2018-06-04 NOTE — ED AVS SNAPSHOT
[facility]    [address]    [place] [address]    Phone:  [phone]    Fax:  [phone]                                       Rox Butler   MRN: [id number]    Department:  [facility]   Date of Visit:  6/4/2018           Patient Information     Date Of Birth          1985        Your diagnoses for this visit were:     Pain, dental     Infected dental caries        You were seen by Yasmeen Schrader MD.      Follow-up Information     Call to follow up.    Why:  and make dental appointment for definitive management         Discharge Instructions         Dental Abscess  An abscess is a sac of pus. A dental abscess forms when a tooth or the tissue around it becomes infected with bacteria. The bacteria can enter through a cavity or a crack in a tooth. It can also infect the gum tissue or bone around a tooth. An untreated abscess can cause the loss of the tooth. It can even spread to other parts of the body and become life-threatening.    Symptoms of a dental abscess   Signs of a dental abscess include:    Toothache, often severe    Tooth pain with hot, cold, or pressure    Pain in the gums, cheek, or jaw    Bad breath or bitter taste in the mouth    Trouble swallowing or opening the mouth    Fever    Swollen or enlarged glands in the neck  Diagnosing a dental abscess  An abscess is diagnosed by looking at your teeth and gums. You will be told if any tests are needed, such as dental X-rays.  Treating a dental abscess  Treatments for a dental abscess may include the following:    Antibiotic medicines. These treat the underlying infection.    Pain relievers. These help you feel more comfortable. Your healthcare provider may prescribe a medicine for you. Or you may use over-the-counter pain relievers, such as acetaminophen or ibuprofen.    Warm saltwater rinses. These can soothe discomfort and help clear away pus.    Root canal  surgery.  This may be done if needed to save the tooth. With a root canal, the infected part of the tooth is removed. A special substance is then used to fill the empty space in the tooth.    Draining the abscess. This may be doneif needed. Incisions are made to allow the infected material to drain from the tooth.    Removing the tooth. This is done in cases of severe infection that can t be treated another way.  You may need to be admitted to a hospital if the infection is severe, has spread, or doesn t respond to treatment.     When to call the dentist  Call your dentist right away if you have any of the following:    Fever of 100.4 F (38 C) or higher    Increased pain, redness, drainage, or swelling in the treated area    Swelling of the face or jawbone    Pain that can't be controlled with medicines   Preventing dental abscess  To prevent another abscess in the future, keep your teeth clean and healthy. Brush twice a day and floss at least once daily. See your dentist for regular tooth cleanings. And stay away from sugary foods and drinks that can lead to tooth decay.  Date Last Reviewed: 6/1/2017 2000-2017 [employer]. [address] [place] [address]. All rights reserved. This information is not intended as a substitute for professional medical care. Always follow your healthcare professional's instructions.          24 Hour Appointment Hotline       To make an appointment at any [facility], call [phone] ([phone]). If you don't have a family doctor or clinic, we will help you find one. [facility] are conveniently located to serve the needs of you and your family.             Review of your medicines      START taking        Dose / Directions Last dose taken    oxyCODONE-acetaminophen 5-325 MG per tablet   Commonly known as:  PERCOCET   Dose:  1 tablet   Quantity:  6 tablet        Take 1 tablet by mouth every 6 hours as needed for pain   Refills:  0                 Information about OPIOIDS     PRESCRIPTION OPIOIDS: WHAT YOU NEED TO KNOW   You have a prescription for an opioid (narcotic) pain medicine. Opioids can cause addiction. If you have a history of chemical dependency of any type, you are at a higher risk of becoming addicted to opioids. Only take this medicine after all other options have been tried. Take it for as short a time and as few doses as possible.     Do not:    Drive. If you drive while taking these medicines, you could be arrested for driving under the influence (DUI).    Operate heavy machinery    Do any other dangerous activities while taking these medicines.     Drink any alcohol while taking these medicines.      Take with any other medicines that contain acetaminophen. Read all labels carefully. Look for the word  acetaminophen  or  Tylenol.  Ask your pharmacist if you have questions or are unsure.    Store your pills in a secure place, locked if possible. We will not replace any lost or stolen medicine. If you don t finish your medicine, please throw away (dispose) as directed by your pharmacist. The [place] [employer]ution [employer] has more information about safe disposal: https://www.pca.[facility].[place].us/living-green/managing-unwanted-medications    All opioids tend to cause constipation. Drink plenty of water and eat foods that have a lot of fiber, such as fruits, vegetables, prune juice, apple juice and high-fiber cereal. Take a laxative (Miralax, milk of magnesia, Colace, Senna) if you don t move your bowels at least every other day.         Prescriptions were sent or printed at these locations (1 Prescription)                   [facility]   [address] [address]    Telephone:     Fax:     Hours:                  [facility] (1 of 1)         oxyCODONE-acetaminophen (PERCOCET) 5-325 MG per tablet                Orders Needing Specimen Collection     None      Pending Results     No orders found  "from 2018 to 2018.            Pending Culture Results     No orders found from 2018 to 2018.            Pending Results Instructions     If you had any lab results that were not finalized at the time of your Discharge, you can call the ED Lab Result RN at [phone]. You will be contacted by this team for any positive Lab results or changes in treatment. The nurses are available 7 days a week from 10A to 6:30P.  You can leave a message 24 hours per day and they will return your call.        Thank you for choosing [facility]       Thank you for choosing [facility] for your care. Our goal is always to provide you with excellent care. Hearing back from our patients is one way we can continue to improve our services. Please take a few minutes to complete the written survey that you may receive in the mail after you visit with us. Thank you!        [employer] Information     [employer] lets you send messages to your doctor, view your test results, renew your prescriptions, schedule appointments and more. To sign up, go to [email or web address] . Click on \"Log in\" on the left side of the screen, which will take you to the Welcome page. Then click on \"Sign up Now\" on the right side of the page.     You will be asked to enter the access code listed below, as well as some personal information. Please follow the directions to create your username and password.     Your access code is: [email or web address]  Expires: 2018  4:16 PM     Your access code will  in 90 days. If you need help or a new code, please call your [facility] clinic or [phone].        Care EveryWhere ID     This is your Care EveryWhere ID. This could be used by other organizations to access your [facility] medical records  [id number]        Equal Access to Services     SY LANGFORD : Ace Galindo, quentin cee, martinez montgomery. So [facility] [phone].    ATENCIÓN: Si habla " español, tiene a carranza disposición servicios gratuitos de asistencia lingüística. Chloe al [phone].    We comply with applicable federal civil rights laws and [place] laws. We do not discriminate on the basis of race, color, national origin, age, disability, sex, sexual orientation, or gender identity.            After Visit Summary       This is your record. Keep this with you and show to your community pharmacist(s) and doctor(s) at your next visit.

## 2018-06-04 NOTE — ED AVS SNAPSHOT
[facility]    [address]    [place] [address]    Phone:  [phone]    Fax:  [phone]                                       Rox Butler   MRN: [id number]    Department:  [facility]   Date of Visit:  6/4/2018           After Visit Summary Signature Page     I have received my discharge instructions, and my questions have been answered. I have discussed any challenges I see with this plan with the nurse or doctor.    ..........................................................................................................................................  Patient/Patient Representative Signature      ..........................................................................................................................................  Patient Representative Print Name and Relationship to Patient    ..................................................               ................................................  Date                                            Time    ..........................................................................................................................................  Reviewed by Signature/Title    ...................................................              ..............................................  Date                                                            Time

## 2018-06-05 RX ORDER — OXYCODONE AND ACETAMINOPHEN 5; 325 MG/1; MG/1
1 TABLET ORAL EVERY 6 HOURS PRN
Qty: 6 TABLET | Refills: 0 | Status: SHIPPED | OUTPATIENT
Start: 2018-06-05 | End: 2018-08-07

## 2018-06-05 NOTE — ED TRIAGE NOTES
"Pt reports bottom left molar is broken and \"miserable pain\". Pt reports he was seen in the ER last week for same problem and unable to make dentist appt tomorrow due to grandpa being ill.   "

## 2018-06-05 NOTE — DISCHARGE INSTRUCTIONS
Dental Abscess  An abscess is a sac of pus. A dental abscess forms when a tooth or the tissue around it becomes infected with bacteria. The bacteria can enter through a cavity or a crack in a tooth. It can also infect the gum tissue or bone around a tooth. An untreated abscess can cause the loss of the tooth. It can even spread to other parts of the body and become life-threatening.    Symptoms of a dental abscess   Signs of a dental abscess include:    Toothache, often severe    Tooth pain with hot, cold, or pressure    Pain in the gums, cheek, or jaw    Bad breath or bitter taste in the mouth    Trouble swallowing or opening the mouth    Fever    Swollen or enlarged glands in the neck  Diagnosing a dental abscess  An abscess is diagnosed by looking at your teeth and gums. You will be told if any tests are needed, such as dental X-rays.  Treating a dental abscess  Treatments for a dental abscess may include the following:    Antibiotic medicines. These treat the underlying infection.    Pain relievers. These help you feel more comfortable. Your healthcare provider may prescribe a medicine for you. Or you may use over-the-counter pain relievers, such as acetaminophen or ibuprofen.    Warm saltwater rinses. These can soothe discomfort and help clear away pus.    Root canal surgery.  This may be done if needed to save the tooth. With a root canal, the infected part of the tooth is removed. A special substance is then used to fill the empty space in the tooth.    Draining the abscess. This may be doneif needed. Incisions are made to allow the infected material to drain from the tooth.    Removing the tooth. This is done in cases of severe infection that can t be treated another way.  You may need to be admitted to a hospital if the infection is severe, has spread, or doesn t respond to treatment.     When to call the dentist  Call your dentist right away if you have any of the following:    Fever of 100.4 F (38 C) or  higher    Increased pain, redness, drainage, or swelling in the treated area    Swelling of the face or jawbone    Pain that can't be controlled with medicines   Preventing dental abscess  To prevent another abscess in the future, keep your teeth clean and healthy. Brush twice a day and floss at least once daily. See your dentist for regular tooth cleanings. And stay away from sugary foods and drinks that can lead to tooth decay.  Date Last Reviewed: 6/1/2017 2000-2017 [employer]. [address] [place] [address], [place] [address]. All rights reserved. This information is not intended as a substitute for professional medical care. Always follow your healthcare professional's instructions.

## 2018-06-05 NOTE — ED NOTES
Pt admit to [facility], ambulatory.  Pt has tooth pain in his bottom left jaw, is still on abx for it but ran out of pain medication.  Pt has a dentist appt tomorrow but has to go to [place] to see his sick grandfather.  They're leaving [facility].

## 2018-06-05 NOTE — ED PROVIDER NOTES
"  History     Chief Complaint   Patient presents with     Dental Pain     HPI  Rox Butler is a 32 year old male who presents for dental pain . Patient was seen in [place] earlier today but states they were unable to help him so he presents to our ER . Patient states that he did have a dental appointment tomorrow but his grandfather became ill in [place] and he needs to leave to go see him tonight . Patient has been to ER and already has prescription for antibiotics . NO facial swelling. NO fever. NO chills . NO other systemic symptoms                                                                       \  \  Problem List:    There are no active problems to display for this patient.       Past Medical History:    Past Medical History:   Diagnosis Date     Personal history of other medical treatment (CODE)        Past Surgical History:    Past Surgical History:   Procedure Laterality Date     OTHER SURGICAL HISTORY      QSA192,WRIST SURGERY,Left       Family History:    No family history on file.    Social History:  Marital Status:  Single [1]  Social History   Substance Use Topics     Smoking status: Current Every Day Smoker     Packs/day: 0.50     Types: Cigarettes     Smokeless tobacco: Never Used     Alcohol use No      Comment: Alcoholic Drinks/day: Currently at [facility]        Medications:      No current outpatient prescriptions on file.      Review of Systems   Constitutional: Negative.    Eyes: Negative.    Respiratory: Negative.    Cardiovascular: Negative.    Gastrointestinal: Negative.    Genitourinary: Negative.    Musculoskeletal: Negative.    Skin: Negative.    Neurological: Negative.    Hematological: Negative.    Psychiatric/Behavioral: Negative.    All other systems reviewed and are negative.      Physical Exam   BP: 123/77  Pulse: 66  Temp: 98.6  F (37  C)  Resp: 16  Height: 180.3 cm (5' 11\")  SpO2: 99 %      Physical Exam   Constitutional: He is oriented to person, place, " and time. He appears well-developed and well-nourished. No distress.   HENT:   Head: Normocephalic and atraumatic.   Right Ear: External ear normal.   Left Ear: External ear normal.   Tooth 20 with swelling and tenderness at base    Eyes: Pupils are equal, round, and reactive to light.   Neck: Normal range of motion. Neck supple. No JVD present. No tracheal deviation present. No thyromegaly present.   Cardiovascular: Normal rate and regular rhythm.    Pulmonary/Chest: Effort normal and breath sounds normal. No stridor.   Abdominal: Soft. Bowel sounds are normal.   Musculoskeletal: Normal range of motion.   Lymphadenopathy:     He has no cervical adenopathy.   Neurological: He is alert and oriented to person, place, and time.   Skin: Skin is warm and dry. He is not diaphoretic.   Psychiatric: He has a normal mood and affect.   Nursing note and vitals reviewed.      ED Course     ED Course     Procedures          Patient presents to ER with concern of dental pain . Patient has now been seen in our ER twice and in [place] just 2 hours ago. Exam consistent with dental infection . Patient already on antibiotics . Discussed patient needs to see dentist for definitive management Dental block offered but declined. Patient given small quantity of norco. He should continue with antibiotics and again stresssed importance of following up with dentist for definitive management        No results found for this or any previous visit (from the past 24 hour(s)).    Medications - No data to display    Assessments & Plan (with Medical Decision Making)     I have reviewed the nursing notes.    I have reviewed the findings, diagnosis, plan and need for follow up with the patient.      New Prescriptions    No medications on file       Final diagnoses:   Pain, dental   Infected dental caries       6/4/2018   [facility] AND \[facility]\"" Yasmeen Le MD  06/06/18 6341

## 2018-06-06 ASSESSMENT — ENCOUNTER SYMPTOMS
EYES NEGATIVE: 1
CONSTITUTIONAL NEGATIVE: 1
HEMATOLOGIC/LYMPHATIC NEGATIVE: 1
NEUROLOGICAL NEGATIVE: 1
CARDIOVASCULAR NEGATIVE: 1
PSYCHIATRIC NEGATIVE: 1
RESPIRATORY NEGATIVE: 1
GASTROINTESTINAL NEGATIVE: 1
MUSCULOSKELETAL NEGATIVE: 1

## 2018-08-07 ENCOUNTER — OFFICE VISIT (OUTPATIENT)
Dept: FAMILY MEDICINE | Facility: OTHER | Age: 33
End: 2018-08-07
Attending: CHIROPRACTOR
Payer: COMMERCIAL

## 2018-08-07 VITALS
HEIGHT: 71 IN | WEIGHT: 168 LBS | RESPIRATION RATE: 16 BRPM | SYSTOLIC BLOOD PRESSURE: 130 MMHG | HEART RATE: 84 BPM | BODY MASS INDEX: 23.52 KG/M2 | DIASTOLIC BLOOD PRESSURE: 80 MMHG

## 2018-08-07 DIAGNOSIS — Z02.89 HEALTH EXAMINATION OF DEFINED SUBPOPULATION: Primary | ICD-10-CM

## 2018-08-07 LAB
ALBUMIN UR-MCNC: NEGATIVE MG/DL
APPEARANCE UR: CLEAR
BILIRUB UR QL STRIP: ABNORMAL
COLOR UR AUTO: YELLOW
GLUCOSE UR STRIP-MCNC: NEGATIVE MG/DL
HGB UR QL STRIP: NEGATIVE
KETONES UR STRIP-MCNC: NEGATIVE MG/DL
LEUKOCYTE ESTERASE UR QL STRIP: NEGATIVE
NITRATE UR QL: NEGATIVE
PH UR STRIP: 8.5 PH (ref 5–9)
SOURCE: ABNORMAL
SP GR UR STRIP: 1.01 (ref 1–1.03)
UROBILINOGEN UR STRIP-ACNC: 1 EU/DL (ref 0.2–1)

## 2018-08-07 PROCEDURE — 99499 UNLISTED E&M SERVICE: CPT | Performed by: CHIROPRACTOR

## 2018-08-07 PROCEDURE — G0463 HOSPITAL OUTPT CLINIC VISIT: HCPCS | Mod: 25

## 2018-08-07 PROCEDURE — 81003 URINALYSIS AUTO W/O SCOPE: CPT | Performed by: CHIROPRACTOR

## 2018-08-07 NOTE — MR AVS SNAPSHOT
"              After Visit Summary   8/7/2018    Rox Butler    MRN: [id number]           Patient Information     Date Of Birth          1985        Visit Information        Provider Department      8/7/2018 11:00 AM Josh Freedman DC [facility]        Today's Diagnoses     Health examination of defined subpopulation    -  1       Follow-ups after your visit        Who to contact     If you have questions or need follow up information about today's clinic visit or your schedule please contact [facility] [place] directly at [phone].  Normal or non-critical lab and imaging results will be communicated to you by MyChart, letter or phone within 4 business days after the clinic has received the results. If you do not hear from us within 7 days, please contact the clinic through MyChart or phone. If you have a critical or abnormal lab result, we will notify you by phone as soon as possible.  Submit refill requests through [email or web address] or call your pharmacy and they will forward the refill request to us. Please allow 3 business days for your refill to be completed.          Additional Information About Your Visit        Care EveryWhere ID     This is your Care EveryWhere ID. This could be used by other organizations to access your [facility] medical records  [id number]        Your Vitals Were     Pulse Respirations Height BMI (Body Mass Index)          84 16 5' 11\" (1.803 m) 23.43 kg/m2         Blood Pressure from Last 3 Encounters:   08/07/18 130/80   06/04/18 122/71   05/30/18 121/78    Weight from Last 3 Encounters:   08/07/18 168 lb (76.2 kg)   05/30/18 180 lb (81.6 kg)   10/24/17 185 lb (83.9 kg)              We Performed the Following     *UA reflex to Microscopic        Primary Care Provider Fax #    Physician No Ref-Primary [phone]       No address on file        Equal Access to Services     SY LANGFORD AH: quentin Zuleta qaybta " martinez walker anmol whiting ah. So [facility] [phone].    ATENCIÓN: Si habla puja, tiene a carranza disposición servicios gratuitos de asistencia lingüística. Llame al [phone].    We comply with applicable federal civil rights laws and [place] laws. We do not discriminate on the basis of race, color, national origin, age, disability, sex, sexual orientation, or gender identity.            Thank you!     Thank you for choosing [facility] AND [facility]  for your care. Our goal is always to provide you with excellent care. Hearing back from our patients is one way we can continue to improve our services. Please take a few minutes to complete the written survey that you may receive in the mail after your visit with us. Thank you!             Your Updated Medication List - Protect others around you: Learn how to safely use, store and throw away your medicines at www.disposemymeds.org.      Notice  As of 8/7/2018 12:01 PM    You have not been prescribed any medications.

## 2018-08-07 NOTE — NURSING NOTE
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 Urine analysis initiated per verbal orderthat was read back and verified.   VISION   No corrective lenses  Tool used: IVÁN   Right eye:        10/8 (20/12.5)  Left eye:          10/8 (20/16)  Visual Acuity: Pass  Color vision screening: Pass   Chief Complaint   Patient presents with     Employment Physical     DOT physical       Initial /80 (BP Location: Right arm, Patient Position: Sitting, Cuff Size: Adult Regular)  Pulse 84  Resp 16  Ht 5' 11\" (1.803 m)  Wt 168 lb (76.2 kg)  BMI 23.43 kg/m2 Estimated body mass index is 23.43 kg/(m^2) as calculated from the following:    Height as of this encounter: 5' 11\" (1.803 m).    Weight as of this encounter: 168 lb (76.2 kg).  Medication Reconciliation: complete    Melissa Rodriguez LPN            "

## 2019-06-23 ENCOUNTER — HOSPITAL ENCOUNTER (EMERGENCY)
Facility: OTHER | Age: 34
Discharge: HOME | End: 2019-06-23
Attending: EMERGENCY MEDICINE | Admitting: EMERGENCY MEDICINE

## 2019-06-23 VITALS
BODY MASS INDEX: 23.52 KG/M2 | SYSTOLIC BLOOD PRESSURE: 137 MMHG | WEIGHT: 168 LBS | HEART RATE: 86 BPM | TEMPERATURE: 97.6 F | DIASTOLIC BLOOD PRESSURE: 84 MMHG | RESPIRATION RATE: 14 BRPM | OXYGEN SATURATION: 99 % | HEIGHT: 71 IN

## 2019-06-23 DIAGNOSIS — J02.9 ACUTE PHARYNGITIS, UNSPECIFIED ETIOLOGY: ICD-10-CM

## 2019-06-23 LAB
DEPRECATED S PYO AG THROAT QL EIA: NORMAL
SPECIMEN SOURCE: NORMAL

## 2019-06-23 PROCEDURE — 87880 STREP A ASSAY W/OPTIC: CPT | Performed by: EMERGENCY MEDICINE

## 2019-06-23 PROCEDURE — 25000132 ZZH RX MED GY IP 250 OP 250 PS 637: Performed by: EMERGENCY MEDICINE

## 2019-06-23 PROCEDURE — 99283 EMERGENCY DEPT VISIT LOW MDM: CPT | Performed by: EMERGENCY MEDICINE

## 2019-06-23 PROCEDURE — 99283 EMERGENCY DEPT VISIT LOW MDM: CPT | Mod: Z6 | Performed by: EMERGENCY MEDICINE

## 2019-06-23 RX ORDER — AMOXICILLIN 875 MG
875 TABLET ORAL 2 TIMES DAILY
Qty: 20 TABLET | Refills: 0 | Status: SHIPPED | OUTPATIENT
Start: 2019-06-23 | End: 2019-07-03

## 2019-06-23 RX ORDER — AMOXICILLIN 500 MG/1
500 CAPSULE ORAL ONCE
Status: COMPLETED | OUTPATIENT
Start: 2019-06-23 | End: 2019-06-23

## 2019-06-23 RX ADMIN — AMOXICILLIN 500 MG: 500 CAPSULE ORAL at 16:12

## 2019-06-23 ASSESSMENT — ENCOUNTER SYMPTOMS
ARTHRALGIAS: 0
SHORTNESS OF BREATH: 0
DYSURIA: 0
LIGHT-HEADEDNESS: 0
TROUBLE SWALLOWING: 1
AGITATION: 0
VOMITING: 0
CHILLS: 0
FEVER: 0
SORE THROAT: 1
VOICE CHANGE: 0
NAUSEA: 0

## 2019-06-23 ASSESSMENT — MIFFLIN-ST. JEOR: SCORE: 1734.17

## 2019-06-23 NOTE — ED AVS SNAPSHOT
[facility]  [address]  [place] [address]  Phone:  [phone]  Fax:  [phone]                                    Rox Butler   MRN: [id number]    Department:  [facility]   Date of Visit:  6/23/2019           After Visit Summary Signature Page    I have received my discharge instructions, and my questions have been answered. I have discussed any challenges I see with this plan with the nurse or doctor.    ..........................................................................................................................................  Patient/Patient Representative Signature      ..........................................................................................................................................  Patient Representative Print Name and Relationship to Patient    ..................................................               ................................................  Date                                   Time    ..........................................................................................................................................  Reviewed by Signature/Title    ...................................................              ..............................................  Date                                               Time          22EPIC Rev 08/18

## 2019-06-23 NOTE — ED TRIAGE NOTES
Pt here with family, pt c/o sore throat x 2 days, white spots noted to back of throat, pt brought back into ER to be evaluated

## 2019-06-23 NOTE — ED PROVIDER NOTES
History     Chief Complaint   Patient presents with     Pharyngitis     HPI  Delroynicol Butler is a 33 year old male who is here with sore throat.  It is been 2 days now.  It is worse in the morning when he wakes up and seems to get better as the day goes on.  This morning when he woke up it was much worse than yesterday.  Was hard for him to swallow.  He did take some Aleve however and the pain is better at this time.  No fevers.  No other URI type symptoms except for headache is also resolved with the Aleve.  Is able to eat and drink.  No nausea or vomiting.  No shortness of breath.    Allergies:  No Known Allergies    Problem List:    There are no active problems to display for this patient.       Past Medical History:    Past Medical History:   Diagnosis Date     Personal history of other medical treatment (CODE)        Past Surgical History:    Past Surgical History:   Procedure Laterality Date     OTHER SURGICAL HISTORY      [id number],WRIST SURGERY,Left       Family History:    No family history on file.    Social History:  Marital Status:  Single [1]  Social History     Tobacco Use     Smoking status: Current Every Day Smoker     Packs/day: 0.50     Types: Cigarettes     Smokeless tobacco: Never Used   Substance Use Topics     Alcohol use: No     Alcohol/week: 0.0 oz     Comment: Alcoholic Drinks/day: Currently at [facility]     Drug use: No     Types: Other     Comment: Drug use: No        Medications:      amoxicillin (AMOXIL) 875 MG tablet         Review of Systems   Constitutional: Negative for chills and fever.   HENT: Positive for sore throat and trouble swallowing. Negative for voice change.    Eyes: Negative for visual disturbance.   Respiratory: Negative for shortness of breath.    Cardiovascular: Negative for chest pain.   Gastrointestinal: Negative for nausea and vomiting.   Genitourinary: Negative for dysuria.   Musculoskeletal: Negative for arthralgias.   Skin: Negative for rash.  "  Neurological: Negative for light-headedness.   Psychiatric/Behavioral: Negative for agitation.       Physical Exam   BP: 137/84  Pulse: 86  Temp: 97.6  F (36.4  C)  Resp: 14  Height: 180.3 cm (5' 11\")  Weight: 76.2 kg (168 lb)  SpO2: 99 %      Physical Exam   Constitutional: He is oriented to person, place, and time. He appears well-developed and well-nourished. No distress.   HENT:   Head: Normocephalic and atraumatic.   Posterior pharynx with some generalized erythema.  The left tonsil is markedly enlarged almost to the uvula, the right is only minimally enlarged. no deviation of soft palate or posterior pharynx.  There is some pharyngeal exudate.   Eyes: Conjunctivae are normal.   Cardiovascular: Normal rate, regular rhythm and normal heart sounds.   Pulmonary/Chest: Effort normal and breath sounds normal.   Neurological: He is alert and oriented to person, place, and time.   Skin: Skin is warm and dry. He is not diaphoretic.   Nursing note and vitals reviewed.      ED Course        Procedures                 Results for orders placed or performed during the hospital encounter of 06/23/19 (from the past 24 hour(s))   Rapid strep screen   Result Value Ref Range    Specimen Description Throat     Rapid Strep A Screen       Negative presumptive for Group A Beta Streptococcus       Medications - No data to display    Assessments & Plan (with Medical Decision Making)     I have reviewed the nursing notes.    I have reviewed the findings, diagnosis, plan and need for follow up with the patient.  Strep is negative, however given the market asymmetry in the fairly remarkable appearance of his left tonsil I am concerned about possible tonsillar abscess.  There is no deeper tissue abscess that I can see evidence of based on lack of asymmetry of soft palate or pharynx.  We will treat with some amoxicillin.  Continue NSAIDs for discomfort.  Return if worse.       Medication List      Started    amoxicillin 875 MG " tablet  Commonly known as:  AMOXIL  875 mg, Oral, 2 TIMES DAILY            Final diagnoses:   Acute pharyngitis, unspecified etiology       6/23/2019   [facility] AND [facility]     Albert Briones MD  06/23/19 0239

## (undated) RX ORDER — AMOXICILLIN 500 MG/1
CAPSULE ORAL
Status: DISPENSED
Start: 2019-06-23